# Patient Record
Sex: FEMALE | Race: WHITE | NOT HISPANIC OR LATINO
[De-identification: names, ages, dates, MRNs, and addresses within clinical notes are randomized per-mention and may not be internally consistent; named-entity substitution may affect disease eponyms.]

---

## 2017-08-18 ENCOUNTER — APPOINTMENT (OUTPATIENT)
Dept: ORTHOPEDIC SURGERY | Facility: CLINIC | Age: 62
End: 2017-08-18
Payer: COMMERCIAL

## 2017-08-18 VITALS
HEIGHT: 64 IN | HEART RATE: 59 BPM | SYSTOLIC BLOOD PRESSURE: 86 MMHG | WEIGHT: 155 LBS | BODY MASS INDEX: 26.46 KG/M2 | DIASTOLIC BLOOD PRESSURE: 53 MMHG

## 2017-08-18 PROCEDURE — 73590 X-RAY EXAM OF LOWER LEG: CPT | Mod: RT

## 2017-08-18 PROCEDURE — 99214 OFFICE O/P EST MOD 30 MIN: CPT

## 2017-08-18 PROCEDURE — 73610 X-RAY EXAM OF ANKLE: CPT | Mod: RT

## 2017-08-22 ENCOUNTER — APPOINTMENT (OUTPATIENT)
Dept: ORTHOPEDIC SURGERY | Facility: CLINIC | Age: 62
End: 2017-08-22
Payer: COMMERCIAL

## 2017-08-22 PROCEDURE — 99215 OFFICE O/P EST HI 40 MIN: CPT

## 2017-08-22 PROCEDURE — 73610 X-RAY EXAM OF ANKLE: CPT | Mod: RT

## 2017-08-25 ENCOUNTER — OUTPATIENT (OUTPATIENT)
Dept: OUTPATIENT SERVICES | Facility: HOSPITAL | Age: 62
LOS: 1 days | End: 2017-08-25

## 2017-08-25 VITALS
WEIGHT: 173.94 LBS | HEIGHT: 64 IN | TEMPERATURE: 97 F | OXYGEN SATURATION: 99 % | SYSTOLIC BLOOD PRESSURE: 112 MMHG | RESPIRATION RATE: 14 BRPM | DIASTOLIC BLOOD PRESSURE: 72 MMHG | HEART RATE: 66 BPM

## 2017-08-25 DIAGNOSIS — S82.891A OTHER FRACTURE OF RIGHT LOWER LEG, INITIAL ENCOUNTER FOR CLOSED FRACTURE: ICD-10-CM

## 2017-08-25 DIAGNOSIS — Z90.89 ACQUIRED ABSENCE OF OTHER ORGANS: Chronic | ICD-10-CM

## 2017-08-25 DIAGNOSIS — S82.91XA UNSPECIFIED FRACTURE OF RIGHT LOWER LEG, INITIAL ENCOUNTER FOR CLOSED FRACTURE: ICD-10-CM

## 2017-08-25 DIAGNOSIS — Z90.79 ACQUIRED ABSENCE OF OTHER GENITAL ORGAN(S): Chronic | ICD-10-CM

## 2017-08-25 NOTE — H&P PST ADULT - NEGATIVE ENMT SYMPTOMS
no hearing difficulty/no vertigo/no tinnitus/no dysphagia/no throat pain/no sinus symptoms/no ear pain

## 2017-08-25 NOTE — H&P PST ADULT - FAMILY HISTORY
Mother  Still living? Unknown  Diabetes mellitus, Age at diagnosis: Age Unknown  Family history of breast cancer, Age at diagnosis: Age Unknown

## 2017-08-25 NOTE — H&P PST ADULT - PMH
<<----- Click to add NO pertinent Past Medical History No pertinent past medical history Ectopic pregnancy

## 2017-08-25 NOTE — H&P PST ADULT - ACTIVITY
using crutches (nonweightbearing on right leg), prior injury was active - moutain climbing using crutches (nonweightbearing on right leg), prior injury was active - mountain climbing

## 2017-08-25 NOTE — H&P PST ADULT - HISTORY OF PRESENT ILLNESS
62 year old female with right ankle fracture 2 weeks ago presents today fo rpresurgical evaluation for ... 62 year old female with right ankle fracture 2 weeks ago presents today for presurgical evaluation for Right Ankle Open Reduction, Internal Fixation Fracture Potential Syndesmotic Screws scheduled on 8/28/17.

## 2017-08-28 ENCOUNTER — OUTPATIENT (OUTPATIENT)
Dept: OUTPATIENT SERVICES | Facility: HOSPITAL | Age: 62
LOS: 1 days | Discharge: HOME | End: 2017-08-28
Payer: COMMERCIAL

## 2017-08-28 ENCOUNTER — APPOINTMENT (OUTPATIENT)
Dept: ORTHOPEDIC SURGERY | Facility: AMBULATORY SURGERY CENTER | Age: 62
End: 2017-08-28

## 2017-08-28 ENCOUNTER — TRANSCRIPTION ENCOUNTER (OUTPATIENT)
Age: 62
End: 2017-08-28

## 2017-08-28 VITALS
RESPIRATION RATE: 14 BRPM | HEART RATE: 70 BPM | OXYGEN SATURATION: 98 % | SYSTOLIC BLOOD PRESSURE: 108 MMHG | DIASTOLIC BLOOD PRESSURE: 58 MMHG

## 2017-08-28 VITALS
TEMPERATURE: 98 F | WEIGHT: 173.94 LBS | SYSTOLIC BLOOD PRESSURE: 118 MMHG | OXYGEN SATURATION: 100 % | HEIGHT: 64 IN | HEART RATE: 63 BPM | DIASTOLIC BLOOD PRESSURE: 62 MMHG | RESPIRATION RATE: 18 BRPM

## 2017-08-28 DIAGNOSIS — Z90.89 ACQUIRED ABSENCE OF OTHER ORGANS: Chronic | ICD-10-CM

## 2017-08-28 DIAGNOSIS — Z90.79 ACQUIRED ABSENCE OF OTHER GENITAL ORGAN(S): Chronic | ICD-10-CM

## 2017-08-28 DIAGNOSIS — S82.891A OTHER FRACTURE OF RIGHT LOWER LEG, INITIAL ENCOUNTER FOR CLOSED FRACTURE: ICD-10-CM

## 2017-08-28 PROCEDURE — 27814 TREATMENT OF ANKLE FRACTURE: CPT | Mod: RT

## 2017-08-28 PROCEDURE — 27829 TREAT LOWER LEG JOINT: CPT | Mod: RT

## 2017-08-28 RX ORDER — OXYCODONE HYDROCHLORIDE 5 MG/1
5 TABLET ORAL EVERY 4 HOURS
Qty: 0 | Refills: 0 | Status: DISCONTINUED | OUTPATIENT
Start: 2017-08-28 | End: 2017-08-28

## 2017-08-28 RX ORDER — ASPIRIN/CALCIUM CARB/MAGNESIUM 324 MG
1 TABLET ORAL
Qty: 30 | Refills: 0 | OUTPATIENT
Start: 2017-08-28 | End: 2017-09-27

## 2017-08-28 RX ORDER — CEFAZOLIN SODIUM 1 G
1000 VIAL (EA) INJECTION ONCE
Qty: 0 | Refills: 0 | Status: DISCONTINUED | OUTPATIENT
Start: 2017-08-28 | End: 2017-09-12

## 2017-08-28 NOTE — ASU DISCHARGE PLAN (ADULT/PEDIATRIC). - NOTIFY
Pain not relieved by Medications/Bleeding that does not stop/Fever greater than 101/Persistent Nausea and Vomiting Pain not relieved by Medications/Persistent Nausea and Vomiting/Bleeding that does not stop/Unable to Urinate/Swelling that continues/Fever greater than 101/Numbness, color, or temperature change to extremity

## 2017-08-28 NOTE — ASU DISCHARGE PLAN (ADULT/PEDIATRIC). - ITEMS TO FOLLOWUP WITH YOUR PHYSICIAN'S
Please follow up with Dr. Brooks within 1-2 weeks. You may call [phone] to schedule an appointment.    You may resume a regular diet and regular activities. Please do not participate in heavy lifting or strenuous exercise. Do not drive while taking pain medication. Please remain nonweight bearing on your right lower extremity. Please take aspirin as prescribed.    Please go to the emergency department if you experience pain not controlled by pain medication, bleeding that will not stop, fevers or chills.

## 2017-08-28 NOTE — ASU DISCHARGE PLAN (ADULT/PEDIATRIC). - MEDICATION SUMMARY - MEDICATIONS TO TAKE
I will START or STAY ON the medications listed below when I get home from the hospital:    oxyCODONE-acetaminophen 5 mg-325 mg oral tablet  -- 1 tab(s) by mouth every 6 hours MDD:4 per day  -- Caution federal law prohibits the transfer of this drug to any person other  than the person for whom it was prescribed.  May cause drowsiness.  Alcohol may intensify this effect.  Use care when operating dangerous machinery.  This prescription cannot be refilled.  This product contains acetaminophen.  Do not use  with any other product containing acetaminophen to prevent possible liver damage.  Using more of this medication than prescribed may cause serious breathing problems.    -- Indication: For Pain    aspirin 325 mg oral tablet  -- 1 tab(s) by mouth once a day  -- Take with food or milk.    -- Indication: For DVT prophylaxis

## 2017-08-28 NOTE — ASU DISCHARGE PLAN (ADULT/PEDIATRIC). - INSTRUCTIONS
Clears to regular  diet as tolerated You may resume a regular diet and regular activities. Please do not participate in heavy lifting or strenuous exercise. Do not drive while taking pain medication. Please remain nonweight bearing on your right lower extremity. Please take aspirin as prescribed.    Please go to the emergency department if you experience pain not controlled by pain medication, bleeding that will not stop, fevers or chills.

## 2017-08-28 NOTE — ASU DISCHARGE PLAN (ADULT/PEDIATRIC). - NURSING INSTRUCTIONS
Narcotics may cause constipation - take stool softeners as needed, increase fiber and fluids.   Take narcotics with food to prevent nausea.

## 2017-08-28 NOTE — ASU DISCHARGE PLAN (ADULT/PEDIATRIC). - ASU FOLLOWUP
911 or go to the nearest Emergency Room [facility] ([facility]):

## 2017-08-28 NOTE — ASU DISCHARGE PLAN (ADULT/PEDIATRIC). - ACTIVITY LEVEL
no weight bearing/no sports/gym/elevate extremity/no exercise/no tub baths no weight bearing/no tub baths/no sports/gym/no exercise/Do not drive while taking pain medication/elevate extremity

## 2017-09-07 ENCOUNTER — APPOINTMENT (OUTPATIENT)
Dept: ORTHOPEDIC SURGERY | Facility: CLINIC | Age: 62
End: 2017-09-07
Payer: COMMERCIAL

## 2017-09-07 PROCEDURE — 99024 POSTOP FOLLOW-UP VISIT: CPT

## 2017-09-07 PROCEDURE — 73610 X-RAY EXAM OF ANKLE: CPT

## 2017-10-02 ENCOUNTER — APPOINTMENT (OUTPATIENT)
Dept: ORTHOPEDIC SURGERY | Facility: CLINIC | Age: 62
End: 2017-10-02
Payer: COMMERCIAL

## 2017-10-02 PROCEDURE — 99024 POSTOP FOLLOW-UP VISIT: CPT

## 2017-10-02 PROCEDURE — 73610 X-RAY EXAM OF ANKLE: CPT | Mod: RT

## 2017-10-13 ENCOUNTER — APPOINTMENT (OUTPATIENT)
Dept: MAMMOGRAPHY | Facility: IMAGING CENTER | Age: 62
End: 2017-10-13
Payer: COMMERCIAL

## 2017-10-13 ENCOUNTER — OUTPATIENT (OUTPATIENT)
Dept: OUTPATIENT SERVICES | Facility: HOSPITAL | Age: 62
LOS: 1 days | End: 2017-10-13
Payer: COMMERCIAL

## 2017-10-13 ENCOUNTER — APPOINTMENT (OUTPATIENT)
Dept: RADIOLOGY | Facility: IMAGING CENTER | Age: 62
End: 2017-10-13
Payer: COMMERCIAL

## 2017-10-13 DIAGNOSIS — Z12.31 ENCOUNTER FOR SCREENING MAMMOGRAM FOR MALIGNANT NEOPLASM OF BREAST: ICD-10-CM

## 2017-10-13 DIAGNOSIS — Z00.8 ENCOUNTER FOR OTHER GENERAL EXAMINATION: ICD-10-CM

## 2017-10-13 DIAGNOSIS — Z90.89 ACQUIRED ABSENCE OF OTHER ORGANS: Chronic | ICD-10-CM

## 2017-10-13 DIAGNOSIS — Z90.79 ACQUIRED ABSENCE OF OTHER GENITAL ORGAN(S): Chronic | ICD-10-CM

## 2017-10-13 PROCEDURE — 77063 BREAST TOMOSYNTHESIS BI: CPT | Mod: 26

## 2017-10-13 PROCEDURE — 77063 BREAST TOMOSYNTHESIS BI: CPT

## 2017-10-13 PROCEDURE — 77080 DXA BONE DENSITY AXIAL: CPT

## 2017-10-13 PROCEDURE — 77080 DXA BONE DENSITY AXIAL: CPT | Mod: 26

## 2017-10-13 PROCEDURE — G0202: CPT | Mod: 26

## 2017-10-13 PROCEDURE — 77067 SCR MAMMO BI INCL CAD: CPT

## 2017-10-18 ENCOUNTER — OUTPATIENT (OUTPATIENT)
Dept: OUTPATIENT SERVICES | Facility: HOSPITAL | Age: 62
LOS: 1 days | End: 2017-10-18
Payer: COMMERCIAL

## 2017-10-18 ENCOUNTER — APPOINTMENT (OUTPATIENT)
Dept: ULTRASOUND IMAGING | Facility: IMAGING CENTER | Age: 62
End: 2017-10-18
Payer: COMMERCIAL

## 2017-10-18 DIAGNOSIS — Z00.00 ENCOUNTER FOR GENERAL ADULT MEDICAL EXAMINATION WITHOUT ABNORMAL FINDINGS: ICD-10-CM

## 2017-10-18 DIAGNOSIS — Z90.79 ACQUIRED ABSENCE OF OTHER GENITAL ORGAN(S): Chronic | ICD-10-CM

## 2017-10-18 DIAGNOSIS — Z90.89 ACQUIRED ABSENCE OF OTHER ORGANS: Chronic | ICD-10-CM

## 2017-10-18 PROCEDURE — 76642 ULTRASOUND BREAST LIMITED: CPT

## 2017-10-18 PROCEDURE — 76642 ULTRASOUND BREAST LIMITED: CPT | Mod: 26,LT

## 2017-10-23 ENCOUNTER — RESULT REVIEW (OUTPATIENT)
Age: 62
End: 2017-10-23

## 2017-10-31 ENCOUNTER — APPOINTMENT (OUTPATIENT)
Dept: ORTHOPEDIC SURGERY | Facility: CLINIC | Age: 62
End: 2017-10-31
Payer: COMMERCIAL

## 2017-10-31 PROCEDURE — 73610 X-RAY EXAM OF ANKLE: CPT | Mod: RT

## 2017-10-31 PROCEDURE — 99024 POSTOP FOLLOW-UP VISIT: CPT

## 2017-11-07 ENCOUNTER — OUTPATIENT (OUTPATIENT)
Dept: OUTPATIENT SERVICES | Facility: HOSPITAL | Age: 62
LOS: 1 days | End: 2017-11-07
Payer: COMMERCIAL

## 2017-11-07 VITALS
RESPIRATION RATE: 16 BRPM | SYSTOLIC BLOOD PRESSURE: 105 MMHG | OXYGEN SATURATION: 100 % | DIASTOLIC BLOOD PRESSURE: 69 MMHG | HEART RATE: 68 BPM | TEMPERATURE: 98 F | HEIGHT: 64 IN | WEIGHT: 169.76 LBS

## 2017-11-07 DIAGNOSIS — Z01.818 ENCOUNTER FOR OTHER PREPROCEDURAL EXAMINATION: ICD-10-CM

## 2017-11-07 DIAGNOSIS — Z90.79 ACQUIRED ABSENCE OF OTHER GENITAL ORGAN(S): Chronic | ICD-10-CM

## 2017-11-07 DIAGNOSIS — S82.891D OTHER FRACTURE OF RIGHT LOWER LEG, SUBSEQUENT ENCOUNTER FOR CLOSED FRACTURE WITH ROUTINE HEALING: ICD-10-CM

## 2017-11-07 DIAGNOSIS — Z90.89 ACQUIRED ABSENCE OF OTHER ORGANS: Chronic | ICD-10-CM

## 2017-11-07 DIAGNOSIS — S93.431D SPRAIN OF TIBIOFIBULAR LIGAMENT OF RIGHT ANKLE, SUBSEQUENT ENCOUNTER: ICD-10-CM

## 2017-11-07 DIAGNOSIS — S82.899A OTHER FRACTURE OF UNSPECIFIED LOWER LEG, INITIAL ENCOUNTER FOR CLOSED FRACTURE: Chronic | ICD-10-CM

## 2017-11-07 LAB
HCT VFR BLD CALC: 41.2 % — SIGNIFICANT CHANGE UP (ref 34.5–45)
HGB BLD-MCNC: 13.8 G/DL — SIGNIFICANT CHANGE UP (ref 11.5–15.5)
MCHC RBC-ENTMCNC: 30.6 PG — SIGNIFICANT CHANGE UP (ref 27–34)
MCHC RBC-ENTMCNC: 33.5 GM/DL — SIGNIFICANT CHANGE UP (ref 32–36)
MCV RBC AUTO: 91.4 FL — SIGNIFICANT CHANGE UP (ref 80–100)
PLATELET # BLD AUTO: 288 K/UL — SIGNIFICANT CHANGE UP (ref 150–400)
RBC # BLD: 4.51 M/UL — SIGNIFICANT CHANGE UP (ref 3.8–5.2)
RBC # FLD: 14.2 % — SIGNIFICANT CHANGE UP (ref 10.3–14.5)
WBC # BLD: 7.98 K/UL — SIGNIFICANT CHANGE UP (ref 3.8–10.5)
WBC # FLD AUTO: 7.98 K/UL — SIGNIFICANT CHANGE UP (ref 3.8–10.5)

## 2017-11-07 PROCEDURE — 85027 COMPLETE CBC AUTOMATED: CPT

## 2017-11-07 PROCEDURE — G0463: CPT

## 2017-11-07 RX ORDER — ACETAMINOPHEN 500 MG
975 TABLET ORAL ONCE
Qty: 0 | Refills: 0 | Status: COMPLETED | OUTPATIENT
Start: 2017-11-10 | End: 2017-11-10

## 2017-11-07 RX ORDER — SODIUM CHLORIDE 9 MG/ML
3 INJECTION INTRAMUSCULAR; INTRAVENOUS; SUBCUTANEOUS EVERY 8 HOURS
Qty: 0 | Refills: 0 | Status: DISCONTINUED | OUTPATIENT
Start: 2017-11-10 | End: 2017-11-25

## 2017-11-07 RX ORDER — LIDOCAINE HCL 20 MG/ML
0.2 VIAL (ML) INJECTION ONCE
Qty: 0 | Refills: 0 | Status: DISCONTINUED | OUTPATIENT
Start: 2017-11-10 | End: 2017-11-25

## 2017-11-07 NOTE — H&P PST ADULT - PSH
Ankle fracture  s/p ORIF right lower leg-ankle 08/2017  History of salpingectomy  for ectopic pregnancies 1990's  History of tonsillectomy

## 2017-11-07 NOTE — H&P PST ADULT - PROBLEM SELECTOR PLAN 1
REMOVAL OF SYNDESMOTIC SCREW RIGHT ANKLE REMOVAL OF SYNDESMOTIC SCREW RIGHT ANKLE as planned upon ligamentous healing

## 2017-11-07 NOTE — H&P PST ADULT - HISTORY OF PRESENT ILLNESS
62 year old female  S/p Right Ankle Open Reduction, Internal Fixation Fracture with Syndesmotic Screws on 8/28/17 for s/p right ankle fracture 08/14/2017 > patient states having removal of syndesmotic screws from right ankle on 11/10/17.

## 2017-11-07 NOTE — H&P PST ADULT - ACTIVITY
Pilates 1x/week before fracture, using crutches (nonweightbearing on right leg), prior injury was active - mountain climbing

## 2017-11-10 ENCOUNTER — APPOINTMENT (OUTPATIENT)
Dept: ORTHOPEDIC SURGERY | Facility: HOSPITAL | Age: 62
End: 2017-11-10

## 2017-11-10 ENCOUNTER — OUTPATIENT (OUTPATIENT)
Dept: OUTPATIENT SERVICES | Facility: HOSPITAL | Age: 62
LOS: 1 days | End: 2017-11-10
Payer: COMMERCIAL

## 2017-11-10 VITALS
HEART RATE: 59 BPM | TEMPERATURE: 99 F | RESPIRATION RATE: 16 BRPM | OXYGEN SATURATION: 100 % | SYSTOLIC BLOOD PRESSURE: 120 MMHG | DIASTOLIC BLOOD PRESSURE: 80 MMHG

## 2017-11-10 VITALS
RESPIRATION RATE: 16 BRPM | WEIGHT: 169.76 LBS | HEIGHT: 64 IN | DIASTOLIC BLOOD PRESSURE: 72 MMHG | HEART RATE: 55 BPM | OXYGEN SATURATION: 100 % | SYSTOLIC BLOOD PRESSURE: 111 MMHG | TEMPERATURE: 98 F

## 2017-11-10 DIAGNOSIS — Z90.79 ACQUIRED ABSENCE OF OTHER GENITAL ORGAN(S): Chronic | ICD-10-CM

## 2017-11-10 DIAGNOSIS — S82.899A OTHER FRACTURE OF UNSPECIFIED LOWER LEG, INITIAL ENCOUNTER FOR CLOSED FRACTURE: Chronic | ICD-10-CM

## 2017-11-10 DIAGNOSIS — S93.431D SPRAIN OF TIBIOFIBULAR LIGAMENT OF RIGHT ANKLE, SUBSEQUENT ENCOUNTER: ICD-10-CM

## 2017-11-10 DIAGNOSIS — Z01.818 ENCOUNTER FOR OTHER PREPROCEDURAL EXAMINATION: ICD-10-CM

## 2017-11-10 DIAGNOSIS — S82.891D OTHER FRACTURE OF RIGHT LOWER LEG, SUBSEQUENT ENCOUNTER FOR CLOSED FRACTURE WITH ROUTINE HEALING: ICD-10-CM

## 2017-11-10 DIAGNOSIS — Z90.89 ACQUIRED ABSENCE OF OTHER ORGANS: Chronic | ICD-10-CM

## 2017-11-10 PROCEDURE — 20680 REMOVAL OF IMPLANT DEEP: CPT

## 2017-11-10 PROCEDURE — 76000 FLUOROSCOPY <1 HR PHYS/QHP: CPT

## 2017-11-10 PROCEDURE — 20680 REMOVAL OF IMPLANT DEEP: CPT | Mod: 58

## 2017-11-10 RX ORDER — FAMOTIDINE 10 MG/ML
1 INJECTION INTRAVENOUS
Qty: 0 | Refills: 0 | COMMUNITY

## 2017-11-10 RX ORDER — CELECOXIB 200 MG/1
200 CAPSULE ORAL ONCE
Qty: 0 | Refills: 0 | Status: COMPLETED | OUTPATIENT
Start: 2017-11-10 | End: 2017-11-10

## 2017-11-10 RX ORDER — SODIUM CHLORIDE 9 MG/ML
1000 INJECTION, SOLUTION INTRAVENOUS
Qty: 0 | Refills: 0 | Status: DISCONTINUED | OUTPATIENT
Start: 2017-11-10 | End: 2017-11-25

## 2017-11-10 RX ORDER — CEFAZOLIN SODIUM 1 G
2000 VIAL (EA) INJECTION ONCE
Qty: 0 | Refills: 0 | Status: COMPLETED | OUTPATIENT
Start: 2017-11-10 | End: 2017-11-10

## 2017-11-10 RX ORDER — ONDANSETRON 8 MG/1
4 TABLET, FILM COATED ORAL ONCE
Qty: 0 | Refills: 0 | Status: DISCONTINUED | OUTPATIENT
Start: 2017-11-10 | End: 2017-11-25

## 2017-11-10 RX ORDER — OXYCODONE HYDROCHLORIDE 5 MG/1
5 TABLET ORAL ONCE
Qty: 0 | Refills: 0 | Status: DISCONTINUED | OUTPATIENT
Start: 2017-11-10 | End: 2017-11-10

## 2017-11-10 RX ADMIN — CELECOXIB 200 MILLIGRAM(S): 200 CAPSULE ORAL at 12:04

## 2017-11-10 RX ADMIN — CELECOXIB 200 MILLIGRAM(S): 200 CAPSULE ORAL at 16:25

## 2017-11-10 RX ADMIN — CELECOXIB 200 MILLIGRAM(S): 200 CAPSULE ORAL at 16:00

## 2017-11-10 RX ADMIN — Medication 975 MILLIGRAM(S): at 12:04

## 2017-11-10 NOTE — ASU DISCHARGE PLAN (ADULT/PEDIATRIC). - MEDICATION SUMMARY - MEDICATIONS TO TAKE
I will START or STAY ON the medications listed below when I get home from the hospital:    oxyCODONE-acetaminophen 5 mg-325 mg oral tablet  -- 1 tab(s) by mouth every 6 hours MDD:4 per day  -- Caution federal law prohibits the transfer of this drug to any person other  than the person for whom it was prescribed.  May cause drowsiness.  Alcohol may intensify this effect.  Use care when operating dangerous machinery.  This prescription cannot be refilled.  This product contains acetaminophen.  Do not use  with any other product containing acetaminophen to prevent possible liver damage.  Using more of this medication than prescribed may cause serious breathing problems.    -- Indication: For Pain

## 2017-11-10 NOTE — ASU DISCHARGE PLAN (ADULT/PEDIATRIC). - ITEMS TO FOLLOWUP WITH YOUR PHYSICIAN'S
Please follow up with Dr. Brooks within 1-2 weeks. You may call [phone] to schedule an appointment.    You may resume a regular diet and regular activities. Please do not participate in heavy lifting or strenuous exercise. Do not drive while taking pain medication.    Please go to the emergency department if you experience pain not controlled by pain medication, bleeding that will not stop, fevers or chills. Please follow up with Dr. Brooks within 1-2 weeks. You may call [phone] to schedule an appointment.    You may resume a regular diet and regular activities. Please do not participate in heavy lifting or strenuous exercise. Do not drive while taking pain medication. You may weight bear in your CAM boot.    Please go to the emergency department if you experience pain not controlled by pain medication, bleeding that will not stop, fevers or chills.

## 2017-11-10 NOTE — ASU DISCHARGE PLAN (ADULT/PEDIATRIC). - NOTIFY
Fever greater than 101/Pain not relieved by Medications/Bleeding that does not stop Swelling that continues/Pain not relieved by Medications/Persistent Nausea and Vomiting/Fever greater than 101/Inability to Tolerate Liquids or Foods/Bleeding that does not stop/Unable to Urinate

## 2017-11-10 NOTE — BRIEF OPERATIVE NOTE - PROCEDURE
<<-----Click on this checkbox to enter Procedure Ankle hardware removal  11/10/2017    Active  FOREIGN

## 2017-11-10 NOTE — ASU DISCHARGE PLAN (ADULT/PEDIATRIC). - FOLLOWUP APPOINTMENT CLINIC/PHYSICIAN
Please follow up with Dr. Brooks within 1-2 weeks. You may call [phone] to schedule an appointment.

## 2017-11-13 ENCOUNTER — TRANSCRIPTION ENCOUNTER (OUTPATIENT)
Age: 62
End: 2017-11-13

## 2017-11-20 ENCOUNTER — APPOINTMENT (OUTPATIENT)
Dept: ORTHOPEDIC SURGERY | Facility: CLINIC | Age: 62
End: 2017-11-20
Payer: COMMERCIAL

## 2017-11-20 PROCEDURE — 99024 POSTOP FOLLOW-UP VISIT: CPT

## 2017-12-14 ENCOUNTER — APPOINTMENT (OUTPATIENT)
Dept: ORTHOPEDIC SURGERY | Facility: CLINIC | Age: 62
End: 2017-12-14
Payer: COMMERCIAL

## 2017-12-14 PROCEDURE — 73610 X-RAY EXAM OF ANKLE: CPT | Mod: RT

## 2017-12-14 PROCEDURE — 99024 POSTOP FOLLOW-UP VISIT: CPT

## 2018-02-08 ENCOUNTER — APPOINTMENT (OUTPATIENT)
Dept: ORTHOPEDIC SURGERY | Facility: CLINIC | Age: 63
End: 2018-02-08
Payer: COMMERCIAL

## 2018-02-08 PROCEDURE — 73610 X-RAY EXAM OF ANKLE: CPT | Mod: RT

## 2018-02-08 PROCEDURE — 99024 POSTOP FOLLOW-UP VISIT: CPT

## 2018-06-27 ENCOUNTER — NON-APPOINTMENT (OUTPATIENT)
Age: 63
End: 2018-06-27

## 2018-06-27 ENCOUNTER — APPOINTMENT (OUTPATIENT)
Dept: FAMILY MEDICINE | Facility: CLINIC | Age: 63
End: 2018-06-27
Payer: COMMERCIAL

## 2018-06-27 VITALS
SYSTOLIC BLOOD PRESSURE: 118 MMHG | TEMPERATURE: 98.6 F | HEART RATE: 64 BPM | RESPIRATION RATE: 12 BRPM | DIASTOLIC BLOOD PRESSURE: 64 MMHG | HEIGHT: 64 IN

## 2018-06-27 VITALS — WEIGHT: 174 LBS

## 2018-06-27 DIAGNOSIS — Z82.49 FAMILY HISTORY OF ISCHEMIC HEART DISEASE AND OTHER DISEASES OF THE CIRCULATORY SYSTEM: ICD-10-CM

## 2018-06-27 DIAGNOSIS — Z78.9 OTHER SPECIFIED HEALTH STATUS: ICD-10-CM

## 2018-06-27 DIAGNOSIS — Z80.3 FAMILY HISTORY OF MALIGNANT NEOPLASM OF BREAST: ICD-10-CM

## 2018-06-27 DIAGNOSIS — E66.9 OBESITY, UNSPECIFIED: ICD-10-CM

## 2018-06-27 DIAGNOSIS — Z00.00 ENCOUNTER FOR GENERAL ADULT MEDICAL EXAMINATION W/OUT ABNORMAL FINDINGS: ICD-10-CM

## 2018-06-27 PROCEDURE — 93000 ELECTROCARDIOGRAM COMPLETE: CPT

## 2018-06-27 PROCEDURE — 99386 PREV VISIT NEW AGE 40-64: CPT | Mod: 25

## 2018-06-27 RX ORDER — OXYCODONE 5 MG/1
5 TABLET ORAL
Qty: 10 | Refills: 0 | Status: DISCONTINUED | COMMUNITY
Start: 2017-08-11 | End: 2018-06-27

## 2018-06-29 ENCOUNTER — APPOINTMENT (OUTPATIENT)
Dept: MAMMOGRAPHY | Facility: IMAGING CENTER | Age: 63
End: 2018-06-29
Payer: COMMERCIAL

## 2018-06-29 ENCOUNTER — APPOINTMENT (OUTPATIENT)
Dept: ULTRASOUND IMAGING | Facility: IMAGING CENTER | Age: 63
End: 2018-06-29
Payer: COMMERCIAL

## 2018-06-29 ENCOUNTER — OUTPATIENT (OUTPATIENT)
Dept: OUTPATIENT SERVICES | Facility: HOSPITAL | Age: 63
LOS: 1 days | End: 2018-06-29
Payer: COMMERCIAL

## 2018-06-29 DIAGNOSIS — S82.899A OTHER FRACTURE OF UNSPECIFIED LOWER LEG, INITIAL ENCOUNTER FOR CLOSED FRACTURE: Chronic | ICD-10-CM

## 2018-06-29 DIAGNOSIS — Z00.8 ENCOUNTER FOR OTHER GENERAL EXAMINATION: ICD-10-CM

## 2018-06-29 DIAGNOSIS — Z90.79 ACQUIRED ABSENCE OF OTHER GENITAL ORGAN(S): Chronic | ICD-10-CM

## 2018-06-29 DIAGNOSIS — Z90.89 ACQUIRED ABSENCE OF OTHER ORGANS: Chronic | ICD-10-CM

## 2018-06-29 PROCEDURE — 77065 DX MAMMO INCL CAD UNI: CPT

## 2018-06-29 PROCEDURE — 77065 DX MAMMO INCL CAD UNI: CPT | Mod: 26,LT

## 2018-06-29 PROCEDURE — 76642 ULTRASOUND BREAST LIMITED: CPT | Mod: 26,LT

## 2018-06-29 PROCEDURE — G0279: CPT | Mod: 26

## 2018-06-29 PROCEDURE — G0279: CPT

## 2018-06-29 PROCEDURE — 76642 ULTRASOUND BREAST LIMITED: CPT

## 2018-07-02 LAB — HBA1C MFR BLD HPLC: 5.5

## 2018-07-16 PROBLEM — S82.899A OTHER FRACTURE OF UNSPECIFIED LOWER LEG, INITIAL ENCOUNTER FOR CLOSED FRACTURE: Chronic | Status: ACTIVE | Noted: 2017-11-07

## 2018-08-14 ENCOUNTER — APPOINTMENT (OUTPATIENT)
Dept: ORTHOPEDIC SURGERY | Facility: CLINIC | Age: 63
End: 2018-08-14
Payer: COMMERCIAL

## 2018-08-14 DIAGNOSIS — S82.891D OTHER FRACTURE OF RIGHT LOWER LEG, SUBSEQUENT ENCOUNTER FOR CLOSED FRACTURE WITH ROUTINE HEALING: ICD-10-CM

## 2018-08-14 DIAGNOSIS — S93.431D SPRAIN OF TIBIOFIBULAR LIGAMENT OF RIGHT ANKLE, SUBSEQUENT ENCOUNTER: ICD-10-CM

## 2018-08-14 PROCEDURE — 99213 OFFICE O/P EST LOW 20 MIN: CPT

## 2018-08-14 PROCEDURE — 73610 X-RAY EXAM OF ANKLE: CPT | Mod: RT

## 2019-02-14 ENCOUNTER — RESULT REVIEW (OUTPATIENT)
Age: 64
End: 2019-02-14

## 2019-03-07 PROBLEM — O00.90 UNSPECIFIED ECTOPIC PREGNANCY WITHOUT INTRAUTERINE PREGNANCY: Chronic | Status: ACTIVE | Noted: 2017-08-25

## 2019-03-29 ENCOUNTER — APPOINTMENT (OUTPATIENT)
Dept: MAMMOGRAPHY | Facility: IMAGING CENTER | Age: 64
End: 2019-03-29
Payer: COMMERCIAL

## 2019-03-29 ENCOUNTER — APPOINTMENT (OUTPATIENT)
Dept: ULTRASOUND IMAGING | Facility: IMAGING CENTER | Age: 64
End: 2019-03-29
Payer: COMMERCIAL

## 2019-03-29 ENCOUNTER — OUTPATIENT (OUTPATIENT)
Dept: OUTPATIENT SERVICES | Facility: HOSPITAL | Age: 64
LOS: 1 days | End: 2019-03-29
Payer: COMMERCIAL

## 2019-03-29 DIAGNOSIS — R92.2 INCONCLUSIVE MAMMOGRAM: ICD-10-CM

## 2019-03-29 DIAGNOSIS — Z90.89 ACQUIRED ABSENCE OF OTHER ORGANS: Chronic | ICD-10-CM

## 2019-03-29 DIAGNOSIS — Z00.00 ENCOUNTER FOR GENERAL ADULT MEDICAL EXAMINATION WITHOUT ABNORMAL FINDINGS: ICD-10-CM

## 2019-03-29 DIAGNOSIS — S82.899A OTHER FRACTURE OF UNSPECIFIED LOWER LEG, INITIAL ENCOUNTER FOR CLOSED FRACTURE: Chronic | ICD-10-CM

## 2019-03-29 DIAGNOSIS — Z90.79 ACQUIRED ABSENCE OF OTHER GENITAL ORGAN(S): Chronic | ICD-10-CM

## 2019-03-29 PROCEDURE — 77065 DX MAMMO INCL CAD UNI: CPT

## 2019-03-29 PROCEDURE — 77063 BREAST TOMOSYNTHESIS BI: CPT | Mod: 26,59

## 2019-03-29 PROCEDURE — 76641 ULTRASOUND BREAST COMPLETE: CPT | Mod: 26,50

## 2019-03-29 PROCEDURE — 76641 ULTRASOUND BREAST COMPLETE: CPT

## 2019-03-29 PROCEDURE — 77065 DX MAMMO INCL CAD UNI: CPT | Mod: 26,GG,RT

## 2019-03-29 PROCEDURE — 77067 SCR MAMMO BI INCL CAD: CPT | Mod: 26,59

## 2019-03-29 PROCEDURE — 77063 BREAST TOMOSYNTHESIS BI: CPT

## 2019-03-29 PROCEDURE — 77067 SCR MAMMO BI INCL CAD: CPT

## 2019-04-03 ENCOUNTER — RESULT REVIEW (OUTPATIENT)
Age: 64
End: 2019-04-03

## 2019-04-03 ENCOUNTER — OUTPATIENT (OUTPATIENT)
Dept: OUTPATIENT SERVICES | Facility: HOSPITAL | Age: 64
LOS: 1 days | End: 2019-04-03
Payer: COMMERCIAL

## 2019-04-03 ENCOUNTER — APPOINTMENT (OUTPATIENT)
Dept: MAMMOGRAPHY | Facility: IMAGING CENTER | Age: 64
End: 2019-04-03
Payer: COMMERCIAL

## 2019-04-03 DIAGNOSIS — Z90.89 ACQUIRED ABSENCE OF OTHER ORGANS: Chronic | ICD-10-CM

## 2019-04-03 DIAGNOSIS — R92.8 OTHER ABNORMAL AND INCONCLUSIVE FINDINGS ON DIAGNOSTIC IMAGING OF BREAST: ICD-10-CM

## 2019-04-03 DIAGNOSIS — Z90.79 ACQUIRED ABSENCE OF OTHER GENITAL ORGAN(S): Chronic | ICD-10-CM

## 2019-04-03 DIAGNOSIS — S82.899A OTHER FRACTURE OF UNSPECIFIED LOWER LEG, INITIAL ENCOUNTER FOR CLOSED FRACTURE: Chronic | ICD-10-CM

## 2019-04-03 PROCEDURE — 77065 DX MAMMO INCL CAD UNI: CPT

## 2019-04-03 PROCEDURE — A4648: CPT

## 2019-04-03 PROCEDURE — 77065 DX MAMMO INCL CAD UNI: CPT | Mod: 26,RT

## 2019-04-03 PROCEDURE — 19081 BX BREAST 1ST LESION STRTCTC: CPT

## 2019-04-03 PROCEDURE — 19081 BX BREAST 1ST LESION STRTCTC: CPT | Mod: RT

## 2020-06-23 ENCOUNTER — RESULT REVIEW (OUTPATIENT)
Age: 65
End: 2020-06-23

## 2020-09-29 ENCOUNTER — APPOINTMENT (OUTPATIENT)
Dept: MAMMOGRAPHY | Facility: IMAGING CENTER | Age: 65
End: 2020-09-29
Payer: COMMERCIAL

## 2020-09-29 ENCOUNTER — APPOINTMENT (OUTPATIENT)
Dept: ULTRASOUND IMAGING | Facility: IMAGING CENTER | Age: 65
End: 2020-09-29
Payer: COMMERCIAL

## 2020-09-29 ENCOUNTER — OUTPATIENT (OUTPATIENT)
Dept: OUTPATIENT SERVICES | Facility: HOSPITAL | Age: 65
LOS: 1 days | End: 2020-09-29
Payer: COMMERCIAL

## 2020-09-29 DIAGNOSIS — Z00.8 ENCOUNTER FOR OTHER GENERAL EXAMINATION: ICD-10-CM

## 2020-09-29 DIAGNOSIS — Z90.79 ACQUIRED ABSENCE OF OTHER GENITAL ORGAN(S): Chronic | ICD-10-CM

## 2020-09-29 DIAGNOSIS — S82.899A OTHER FRACTURE OF UNSPECIFIED LOWER LEG, INITIAL ENCOUNTER FOR CLOSED FRACTURE: Chronic | ICD-10-CM

## 2020-09-29 DIAGNOSIS — Z90.89 ACQUIRED ABSENCE OF OTHER ORGANS: Chronic | ICD-10-CM

## 2020-09-29 PROCEDURE — 77067 SCR MAMMO BI INCL CAD: CPT

## 2020-09-29 PROCEDURE — 77063 BREAST TOMOSYNTHESIS BI: CPT | Mod: 26

## 2020-09-29 PROCEDURE — 76641 ULTRASOUND BREAST COMPLETE: CPT | Mod: 26,50

## 2020-09-29 PROCEDURE — 77063 BREAST TOMOSYNTHESIS BI: CPT

## 2020-09-29 PROCEDURE — 77067 SCR MAMMO BI INCL CAD: CPT | Mod: 26

## 2020-09-29 PROCEDURE — 76641 ULTRASOUND BREAST COMPLETE: CPT

## 2020-11-09 ENCOUNTER — OUTPATIENT (OUTPATIENT)
Dept: OUTPATIENT SERVICES | Facility: HOSPITAL | Age: 65
LOS: 1 days | End: 2020-11-09
Payer: COMMERCIAL

## 2020-11-09 ENCOUNTER — APPOINTMENT (OUTPATIENT)
Dept: ULTRASOUND IMAGING | Facility: IMAGING CENTER | Age: 65
End: 2020-11-09
Payer: COMMERCIAL

## 2020-11-09 DIAGNOSIS — N64.89 OTHER SPECIFIED DISORDERS OF BREAST: ICD-10-CM

## 2020-11-09 DIAGNOSIS — Z90.89 ACQUIRED ABSENCE OF OTHER ORGANS: Chronic | ICD-10-CM

## 2020-11-09 DIAGNOSIS — Z90.79 ACQUIRED ABSENCE OF OTHER GENITAL ORGAN(S): Chronic | ICD-10-CM

## 2020-11-09 DIAGNOSIS — Z00.8 ENCOUNTER FOR OTHER GENERAL EXAMINATION: ICD-10-CM

## 2020-11-09 DIAGNOSIS — S82.899A OTHER FRACTURE OF UNSPECIFIED LOWER LEG, INITIAL ENCOUNTER FOR CLOSED FRACTURE: Chronic | ICD-10-CM

## 2020-11-09 PROCEDURE — 76642 ULTRASOUND BREAST LIMITED: CPT | Mod: 26,LT

## 2020-11-09 PROCEDURE — 76642 ULTRASOUND BREAST LIMITED: CPT

## 2021-02-09 ENCOUNTER — OUTPATIENT (OUTPATIENT)
Dept: OUTPATIENT SERVICES | Facility: HOSPITAL | Age: 66
LOS: 1 days | End: 2021-02-09

## 2021-02-09 VITALS
RESPIRATION RATE: 14 BRPM | DIASTOLIC BLOOD PRESSURE: 64 MMHG | SYSTOLIC BLOOD PRESSURE: 120 MMHG | WEIGHT: 175.93 LBS | OXYGEN SATURATION: 98 % | HEIGHT: 64 IN | HEART RATE: 72 BPM | TEMPERATURE: 97 F

## 2021-02-09 DIAGNOSIS — N62 HYPERTROPHY OF BREAST: ICD-10-CM

## 2021-02-09 DIAGNOSIS — S82.899A OTHER FRACTURE OF UNSPECIFIED LOWER LEG, INITIAL ENCOUNTER FOR CLOSED FRACTURE: Chronic | ICD-10-CM

## 2021-02-09 DIAGNOSIS — Z90.89 ACQUIRED ABSENCE OF OTHER ORGANS: Chronic | ICD-10-CM

## 2021-02-09 DIAGNOSIS — Z90.79 ACQUIRED ABSENCE OF OTHER GENITAL ORGAN(S): Chronic | ICD-10-CM

## 2021-02-09 RX ORDER — SODIUM CHLORIDE 9 MG/ML
1000 INJECTION, SOLUTION INTRAVENOUS
Refills: 0 | Status: DISCONTINUED | OUTPATIENT
Start: 2021-02-18 | End: 2021-03-04

## 2021-02-09 NOTE — H&P PST ADULT - NSICDXFAMILYHX_GEN_ALL_CORE_FT
FAMILY HISTORY:  Mother  Still living? Unknown  Diabetes mellitus, Age at diagnosis: Age Unknown  Family history of breast cancer, Age at diagnosis: Age Unknown

## 2021-02-09 NOTE — H&P PST ADULT - NSICDXPROBLEM_GEN_ALL_CORE_FT
PROBLEM DIAGNOSES  Problem: Hypertrophy of breast  Assessment and Plan:        PROBLEM DIAGNOSES  Problem: Hypertrophy of breast  Assessment and Plan: Pt scheduled for bilateral breast reduction on 2/18/2021.  labs and ekg done in pmd office- in chart, medical eval in chart.  Pt scheduled for covid testing.  Hibiclens provided-  written and verbal instructions given with teach back, pt able to verbalize understanding.  Preop teaching done, pt able to verbalize understanding.   meds day of surgery pepcid

## 2021-02-09 NOTE — H&P PST ADULT - MUSCULOSKELETAL
RLE, S/P ORIF for RT ankle fracture/decreased ROM due to pain details… detailed exam ROM intact/normal strength

## 2021-02-09 NOTE — H&P PST ADULT - NSICDXPASTSURGICALHX_GEN_ALL_CORE_FT
PAST SURGICAL HISTORY:  Ankle fracture s/p ORIF right lower leg-ankle 08/2017    History of salpingectomy for ectopic pregnancies 1990's    History of tonsillectomy      PAST SURGICAL HISTORY:  Ankle fracture s/p ORIF right lower leg-ankle 08/2017, removal of large screws 11/2017    History of salpingectomy for ectopic pregnancies 1990's    History of tonsillectomy

## 2021-02-09 NOTE — H&P PST ADULT - GASTROINTESTINAL DETAILS
soft/nontender/no distention/bowel sounds normal soft/nontender/no distention/no masses palpable/bowel sounds normal/no bruit/no rebound tenderness/no guarding/no rigidity/no organomegaly

## 2021-02-09 NOTE — H&P PST ADULT - RS GEN PE MLT RESP DETAILS PC
breath sounds equal/clear to auscultation bilaterally/no rales/no rhonchi/no wheezes breath sounds equal/good air movement/respirations non-labored/clear to auscultation bilaterally/no chest wall tenderness/no intercostal retractions/no rales/no rhonchi/no wheezes

## 2021-02-09 NOTE — H&P PST ADULT - NSICDXPASTMEDICALHX_GEN_ALL_CORE_FT
PAST MEDICAL HISTORY:  Ankle fracture right ankle fracture (08/14/17)> ORIF   RIGHT 08/2017    Ectopic pregnancy      PAST MEDICAL HISTORY:  Ankle fracture right ankle fracture (08/14/17)> ORIF   RIGHT 08/2017    Ectopic pregnancy     Hypertrophy of breast

## 2021-02-09 NOTE — H&P PST ADULT - HISTORY OF PRESENT ILLNESS
64y/o female scheduled for bilateral breast reduction on 2/18/2021.  Pt states, "upper back and neck pain worse since menopause @ 52y/o."

## 2021-02-13 DIAGNOSIS — Z01.818 ENCOUNTER FOR OTHER PREPROCEDURAL EXAMINATION: ICD-10-CM

## 2021-02-15 ENCOUNTER — APPOINTMENT (OUTPATIENT)
Dept: DISASTER EMERGENCY | Facility: CLINIC | Age: 66
End: 2021-02-15

## 2021-02-16 LAB — SARS-COV-2 N GENE NPH QL NAA+PROBE: NOT DETECTED

## 2021-02-17 NOTE — ASU PATIENT PROFILE, ADULT - PSH
Ankle fracture  s/p ORIF right lower leg-ankle 08/2017, removal of large screws 11/2017  History of salpingectomy  for ectopic pregnancies 1990's  History of tonsillectomy

## 2021-02-17 NOTE — ASU PATIENT PROFILE, ADULT - PMH
Ankle fracture  right ankle fracture (08/14/17)> ORIF   RIGHT 08/2017  Ectopic pregnancy    Hypertrophy of breast

## 2021-02-18 ENCOUNTER — OUTPATIENT (OUTPATIENT)
Dept: OUTPATIENT SERVICES | Facility: HOSPITAL | Age: 66
LOS: 1 days | Discharge: HOME | End: 2021-02-18
Payer: COMMERCIAL

## 2021-02-18 ENCOUNTER — RESULT REVIEW (OUTPATIENT)
Age: 66
End: 2021-02-18

## 2021-02-18 VITALS
HEIGHT: 64 IN | RESPIRATION RATE: 18 BRPM | DIASTOLIC BLOOD PRESSURE: 61 MMHG | TEMPERATURE: 98 F | HEART RATE: 65 BPM | WEIGHT: 175.93 LBS | OXYGEN SATURATION: 99 % | SYSTOLIC BLOOD PRESSURE: 129 MMHG

## 2021-02-18 VITALS
RESPIRATION RATE: 16 BRPM | HEART RATE: 87 BPM | SYSTOLIC BLOOD PRESSURE: 105 MMHG | OXYGEN SATURATION: 96 % | DIASTOLIC BLOOD PRESSURE: 51 MMHG

## 2021-02-18 DIAGNOSIS — N62 HYPERTROPHY OF BREAST: ICD-10-CM

## 2021-02-18 DIAGNOSIS — Z90.89 ACQUIRED ABSENCE OF OTHER ORGANS: Chronic | ICD-10-CM

## 2021-02-18 DIAGNOSIS — S82.899A OTHER FRACTURE OF UNSPECIFIED LOWER LEG, INITIAL ENCOUNTER FOR CLOSED FRACTURE: Chronic | ICD-10-CM

## 2021-02-18 DIAGNOSIS — Z90.79 ACQUIRED ABSENCE OF OTHER GENITAL ORGAN(S): Chronic | ICD-10-CM

## 2021-02-18 PROCEDURE — 19318 BREAST REDUCTION: CPT | Mod: 50

## 2021-02-18 PROCEDURE — 88305 TISSUE EXAM BY PATHOLOGIST: CPT | Mod: 26

## 2021-02-18 RX ORDER — ALIROCUMAB 75 MG/ML
1 INJECTION, SOLUTION SUBCUTANEOUS
Qty: 0 | Refills: 0 | DISCHARGE

## 2021-02-18 RX ORDER — OXYCODONE HYDROCHLORIDE 5 MG/1
5 TABLET ORAL ONCE
Refills: 0 | Status: DISCONTINUED | OUTPATIENT
Start: 2021-02-18 | End: 2021-02-18

## 2021-02-18 RX ORDER — HYDROMORPHONE HYDROCHLORIDE 2 MG/ML
0.5 INJECTION INTRAMUSCULAR; INTRAVENOUS; SUBCUTANEOUS
Refills: 0 | Status: DISCONTINUED | OUTPATIENT
Start: 2021-02-18 | End: 2021-02-18

## 2021-02-18 RX ORDER — ONDANSETRON 8 MG/1
4 TABLET, FILM COATED ORAL ONCE
Refills: 0 | Status: COMPLETED | OUTPATIENT
Start: 2021-02-18 | End: 2021-02-18

## 2021-02-18 RX ADMIN — HYDROMORPHONE HYDROCHLORIDE 0.5 MILLIGRAM(S): 2 INJECTION INTRAMUSCULAR; INTRAVENOUS; SUBCUTANEOUS at 13:23

## 2021-02-18 RX ADMIN — ONDANSETRON 4 MILLIGRAM(S): 8 TABLET, FILM COATED ORAL at 13:23

## 2021-02-18 NOTE — ASU DISCHARGE PLAN (ADULT/PEDIATRIC) - CALL YOUR DOCTOR IF YOU HAVE ANY OF THE FOLLOWING:
Bleeding that does not stop/Pain not relieved by Medications/Fever greater than (need to indicate Fahrenheit or Celsius)/Wound/Surgical Site with redness, or foul smelling discharge or pus/Nausea and vomiting that does not stop

## 2021-02-18 NOTE — ASU DISCHARGE PLAN (ADULT/PEDIATRIC) - ASU DC SPECIAL INSTRUCTIONSFT
Please continue to wear your surgical bra and keep all incisions dry until cleared by Dr Sanchez.      Please sleep with 2-3 pillows under your head to keep your head elevated while you sleep.  Please limit yourself from all strenuous activity until cleared by Dr Sanchez.     Please take the medications as prescribed by Dr Sanchez.    Please call Dr Sanchez's office to schedule your follow up appointment.

## 2021-02-18 NOTE — ASU DISCHARGE PLAN (ADULT/PEDIATRIC) - NURSING INSTRUCTIONS
DO NOT take any Tylenol (Acetaminophen) or narcotics containing Tylenol until after 5:45pm . You received Tylenol during your operation and it can cause damage to your liver if too much is taken within a 24 hour time period.  Sponge baths only until seen by Dr. Sanchez.

## 2021-02-18 NOTE — ASU DISCHARGE PLAN (ADULT/PEDIATRIC) - CARE PROVIDER_API CALL
Brandon Sanchez)  Plastic Surgery  [address] [place] [address], [address]  [place] [address]  Phone: [phone]  Fax: [phone]  Follow Up Time: 1 week

## 2021-02-23 LAB — SURGICAL PATHOLOGY STUDY: SIGNIFICANT CHANGE UP

## 2021-08-24 PROBLEM — N62 HYPERTROPHY OF BREAST: Chronic | Status: ACTIVE | Noted: 2021-02-09

## 2021-08-27 ENCOUNTER — OUTPATIENT (OUTPATIENT)
Dept: OUTPATIENT SERVICES | Facility: HOSPITAL | Age: 66
LOS: 1 days | End: 2021-08-27
Payer: COMMERCIAL

## 2021-08-27 ENCOUNTER — APPOINTMENT (OUTPATIENT)
Dept: RADIOLOGY | Facility: IMAGING CENTER | Age: 66
End: 2021-08-27
Payer: COMMERCIAL

## 2021-08-27 DIAGNOSIS — S82.899A OTHER FRACTURE OF UNSPECIFIED LOWER LEG, INITIAL ENCOUNTER FOR CLOSED FRACTURE: Chronic | ICD-10-CM

## 2021-08-27 DIAGNOSIS — Z90.79 ACQUIRED ABSENCE OF OTHER GENITAL ORGAN(S): Chronic | ICD-10-CM

## 2021-08-27 DIAGNOSIS — Z00.8 ENCOUNTER FOR OTHER GENERAL EXAMINATION: ICD-10-CM

## 2021-08-27 DIAGNOSIS — Z90.89 ACQUIRED ABSENCE OF OTHER ORGANS: Chronic | ICD-10-CM

## 2021-08-27 PROCEDURE — 77080 DXA BONE DENSITY AXIAL: CPT | Mod: 26

## 2021-08-27 PROCEDURE — 77080 DXA BONE DENSITY AXIAL: CPT

## 2022-01-18 ENCOUNTER — TRANSCRIPTION ENCOUNTER (OUTPATIENT)
Age: 67
End: 2022-01-18

## 2022-02-06 ENCOUNTER — TRANSCRIPTION ENCOUNTER (OUTPATIENT)
Age: 67
End: 2022-02-06

## 2022-04-12 ENCOUNTER — TRANSCRIPTION ENCOUNTER (OUTPATIENT)
Age: 67
End: 2022-04-12

## 2022-07-14 NOTE — ASU PATIENT PROFILE, ADULT - MEDICATIONS BROUGHT TO HOSPITAL, PROFILE
What Type Of Note Output Would You Prefer (Optional)?: Standard Output What Is The Reason For Today's Visit?: Full Body Skin Examination What Is The Reason For Today's Visit? (Being Monitored For X): the development of new lesions How Severe Are Your Spot(S)?: mild no

## 2022-10-07 ENCOUNTER — APPOINTMENT (OUTPATIENT)
Dept: MAMMOGRAPHY | Facility: IMAGING CENTER | Age: 67
End: 2022-10-07

## 2022-10-07 ENCOUNTER — APPOINTMENT (OUTPATIENT)
Dept: ULTRASOUND IMAGING | Facility: IMAGING CENTER | Age: 67
End: 2022-10-07

## 2022-10-07 ENCOUNTER — OUTPATIENT (OUTPATIENT)
Dept: OUTPATIENT SERVICES | Facility: HOSPITAL | Age: 67
LOS: 1 days | End: 2022-10-07
Payer: MEDICARE

## 2022-10-07 DIAGNOSIS — Z90.89 ACQUIRED ABSENCE OF OTHER ORGANS: Chronic | ICD-10-CM

## 2022-10-07 DIAGNOSIS — Z00.8 ENCOUNTER FOR OTHER GENERAL EXAMINATION: ICD-10-CM

## 2022-10-07 DIAGNOSIS — Z90.79 ACQUIRED ABSENCE OF OTHER GENITAL ORGAN(S): Chronic | ICD-10-CM

## 2022-10-07 DIAGNOSIS — S82.899A OTHER FRACTURE OF UNSPECIFIED LOWER LEG, INITIAL ENCOUNTER FOR CLOSED FRACTURE: Chronic | ICD-10-CM

## 2022-10-07 PROCEDURE — 76641 ULTRASOUND BREAST COMPLETE: CPT

## 2022-10-07 PROCEDURE — 77067 SCR MAMMO BI INCL CAD: CPT | Mod: 26

## 2022-10-07 PROCEDURE — 77063 BREAST TOMOSYNTHESIS BI: CPT | Mod: 26

## 2022-10-07 PROCEDURE — 77063 BREAST TOMOSYNTHESIS BI: CPT

## 2022-10-07 PROCEDURE — 77067 SCR MAMMO BI INCL CAD: CPT

## 2022-10-07 PROCEDURE — 76641 ULTRASOUND BREAST COMPLETE: CPT | Mod: 26,50

## 2022-10-14 ENCOUNTER — APPOINTMENT (OUTPATIENT)
Dept: MAMMOGRAPHY | Facility: IMAGING CENTER | Age: 67
End: 2022-10-14

## 2022-10-14 ENCOUNTER — OUTPATIENT (OUTPATIENT)
Dept: OUTPATIENT SERVICES | Facility: HOSPITAL | Age: 67
LOS: 1 days | End: 2022-10-14
Payer: MEDICARE

## 2022-10-14 ENCOUNTER — APPOINTMENT (OUTPATIENT)
Dept: ULTRASOUND IMAGING | Facility: IMAGING CENTER | Age: 67
End: 2022-10-14

## 2022-10-14 DIAGNOSIS — S82.899A OTHER FRACTURE OF UNSPECIFIED LOWER LEG, INITIAL ENCOUNTER FOR CLOSED FRACTURE: Chronic | ICD-10-CM

## 2022-10-14 DIAGNOSIS — Z90.89 ACQUIRED ABSENCE OF OTHER ORGANS: Chronic | ICD-10-CM

## 2022-10-14 DIAGNOSIS — Z90.79 ACQUIRED ABSENCE OF OTHER GENITAL ORGAN(S): Chronic | ICD-10-CM

## 2022-10-14 DIAGNOSIS — Z00.8 ENCOUNTER FOR OTHER GENERAL EXAMINATION: ICD-10-CM

## 2022-10-14 PROCEDURE — 76642 ULTRASOUND BREAST LIMITED: CPT | Mod: 26,50

## 2022-10-14 PROCEDURE — 77065 DX MAMMO INCL CAD UNI: CPT | Mod: 26,RT

## 2022-10-14 PROCEDURE — 76642 ULTRASOUND BREAST LIMITED: CPT

## 2022-10-14 PROCEDURE — 77065 DX MAMMO INCL CAD UNI: CPT

## 2023-01-25 ENCOUNTER — TRANSCRIPTION ENCOUNTER (OUTPATIENT)
Age: 68
End: 2023-01-25

## 2023-10-24 ENCOUNTER — APPOINTMENT (OUTPATIENT)
Dept: ULTRASOUND IMAGING | Facility: IMAGING CENTER | Age: 68
End: 2023-10-24

## 2023-10-24 ENCOUNTER — APPOINTMENT (OUTPATIENT)
Dept: MAMMOGRAPHY | Facility: IMAGING CENTER | Age: 68
End: 2023-10-24

## 2023-12-04 ENCOUNTER — APPOINTMENT (OUTPATIENT)
Dept: MAMMOGRAPHY | Facility: IMAGING CENTER | Age: 68
End: 2023-12-04
Payer: MEDICARE

## 2023-12-04 ENCOUNTER — OUTPATIENT (OUTPATIENT)
Dept: OUTPATIENT SERVICES | Facility: HOSPITAL | Age: 68
LOS: 1 days | End: 2023-12-04
Payer: MEDICARE

## 2023-12-04 ENCOUNTER — APPOINTMENT (OUTPATIENT)
Dept: ULTRASOUND IMAGING | Facility: IMAGING CENTER | Age: 68
End: 2023-12-04
Payer: MEDICARE

## 2023-12-04 DIAGNOSIS — Z00.8 ENCOUNTER FOR OTHER GENERAL EXAMINATION: ICD-10-CM

## 2023-12-04 DIAGNOSIS — Z90.89 ACQUIRED ABSENCE OF OTHER ORGANS: Chronic | ICD-10-CM

## 2023-12-04 DIAGNOSIS — Z90.79 ACQUIRED ABSENCE OF OTHER GENITAL ORGAN(S): Chronic | ICD-10-CM

## 2023-12-04 DIAGNOSIS — S82.899A OTHER FRACTURE OF UNSPECIFIED LOWER LEG, INITIAL ENCOUNTER FOR CLOSED FRACTURE: Chronic | ICD-10-CM

## 2023-12-04 PROCEDURE — 77067 SCR MAMMO BI INCL CAD: CPT

## 2023-12-04 PROCEDURE — 77067 SCR MAMMO BI INCL CAD: CPT | Mod: 26

## 2023-12-04 PROCEDURE — 77063 BREAST TOMOSYNTHESIS BI: CPT | Mod: 26

## 2023-12-04 PROCEDURE — 77063 BREAST TOMOSYNTHESIS BI: CPT

## 2023-12-04 PROCEDURE — 76641 ULTRASOUND BREAST COMPLETE: CPT

## 2023-12-04 PROCEDURE — 76641 ULTRASOUND BREAST COMPLETE: CPT | Mod: 26,50,GY

## 2023-12-06 ENCOUNTER — APPOINTMENT (OUTPATIENT)
Dept: MAMMOGRAPHY | Facility: IMAGING CENTER | Age: 68
End: 2023-12-06
Payer: MEDICARE

## 2023-12-06 ENCOUNTER — OUTPATIENT (OUTPATIENT)
Dept: OUTPATIENT SERVICES | Facility: HOSPITAL | Age: 68
LOS: 1 days | End: 2023-12-06
Payer: MEDICARE

## 2023-12-06 DIAGNOSIS — Z00.8 ENCOUNTER FOR OTHER GENERAL EXAMINATION: ICD-10-CM

## 2023-12-06 DIAGNOSIS — S82.899A OTHER FRACTURE OF UNSPECIFIED LOWER LEG, INITIAL ENCOUNTER FOR CLOSED FRACTURE: Chronic | ICD-10-CM

## 2023-12-06 DIAGNOSIS — Z90.89 ACQUIRED ABSENCE OF OTHER ORGANS: Chronic | ICD-10-CM

## 2023-12-06 DIAGNOSIS — Z90.79 ACQUIRED ABSENCE OF OTHER GENITAL ORGAN(S): Chronic | ICD-10-CM

## 2023-12-06 PROCEDURE — 77065 DX MAMMO INCL CAD UNI: CPT | Mod: 26,LT

## 2023-12-06 PROCEDURE — G0279: CPT | Mod: 26

## 2023-12-06 PROCEDURE — G0279: CPT

## 2023-12-06 PROCEDURE — 77065 DX MAMMO INCL CAD UNI: CPT

## 2024-03-18 ENCOUNTER — NON-APPOINTMENT (OUTPATIENT)
Age: 69
End: 2024-03-18

## 2024-05-02 ENCOUNTER — OUTPATIENT (OUTPATIENT)
Dept: OUTPATIENT SERVICES | Facility: HOSPITAL | Age: 69
LOS: 1 days | End: 2024-05-02
Payer: MEDICARE

## 2024-05-02 ENCOUNTER — APPOINTMENT (OUTPATIENT)
Dept: ULTRASOUND IMAGING | Facility: CLINIC | Age: 69
End: 2024-05-02

## 2024-05-02 DIAGNOSIS — N63.20 UNSPECIFIED LUMP IN THE LEFT BREAST, UNSPECIFIED QUADRANT: ICD-10-CM

## 2024-05-02 DIAGNOSIS — Z90.79 ACQUIRED ABSENCE OF OTHER GENITAL ORGAN(S): Chronic | ICD-10-CM

## 2024-05-02 DIAGNOSIS — Z90.89 ACQUIRED ABSENCE OF OTHER ORGANS: Chronic | ICD-10-CM

## 2024-05-02 DIAGNOSIS — S82.899A OTHER FRACTURE OF UNSPECIFIED LOWER LEG, INITIAL ENCOUNTER FOR CLOSED FRACTURE: Chronic | ICD-10-CM

## 2024-05-02 PROCEDURE — 76641 ULTRASOUND BREAST COMPLETE: CPT

## 2024-05-02 PROCEDURE — 76641 ULTRASOUND BREAST COMPLETE: CPT | Mod: 26,50,3G

## 2024-06-21 ENCOUNTER — NON-APPOINTMENT (OUTPATIENT)
Age: 69
End: 2024-06-21

## 2025-05-05 ENCOUNTER — APPOINTMENT (OUTPATIENT)
Dept: MAMMOGRAPHY | Facility: IMAGING CENTER | Age: 70
End: 2025-05-05

## 2025-05-05 ENCOUNTER — OUTPATIENT (OUTPATIENT)
Dept: OUTPATIENT SERVICES | Facility: HOSPITAL | Age: 70
LOS: 1 days | End: 2025-05-05
Payer: MEDICARE

## 2025-05-05 DIAGNOSIS — S82.899A OTHER FRACTURE OF UNSPECIFIED LOWER LEG, INITIAL ENCOUNTER FOR CLOSED FRACTURE: Chronic | ICD-10-CM

## 2025-05-05 DIAGNOSIS — Z90.79 ACQUIRED ABSENCE OF OTHER GENITAL ORGAN(S): Chronic | ICD-10-CM

## 2025-05-05 DIAGNOSIS — Z12.31 ENCOUNTER FOR SCREENING MAMMOGRAM FOR MALIGNANT NEOPLASM OF BREAST: ICD-10-CM

## 2025-05-05 DIAGNOSIS — Z90.89 ACQUIRED ABSENCE OF OTHER ORGANS: Chronic | ICD-10-CM

## 2025-05-05 PROCEDURE — 76641 ULTRASOUND BREAST COMPLETE: CPT

## 2025-05-05 PROCEDURE — 77067 SCR MAMMO BI INCL CAD: CPT

## 2025-05-05 PROCEDURE — 77067 SCR MAMMO BI INCL CAD: CPT | Mod: 26

## 2025-05-05 PROCEDURE — 76641 ULTRASOUND BREAST COMPLETE: CPT | Mod: 26,50,GA

## 2025-05-05 PROCEDURE — 77063 BREAST TOMOSYNTHESIS BI: CPT

## 2025-05-05 PROCEDURE — 77063 BREAST TOMOSYNTHESIS BI: CPT | Mod: 26
